# Patient Record
Sex: MALE | Race: WHITE | NOT HISPANIC OR LATINO | URBAN - METROPOLITAN AREA
[De-identification: names, ages, dates, MRNs, and addresses within clinical notes are randomized per-mention and may not be internally consistent; named-entity substitution may affect disease eponyms.]

---

## 2019-01-14 ENCOUNTER — OUTPATIENT (OUTPATIENT)
Dept: INPATIENT UNIT | Facility: HOSPITAL | Age: 54
LOS: 1 days | Discharge: HOME | End: 2019-01-14
Payer: COMMERCIAL

## 2019-01-14 ENCOUNTER — RESULT REVIEW (OUTPATIENT)
Age: 54
End: 2019-01-14

## 2019-01-14 VITALS
WEIGHT: 237.66 LBS | HEIGHT: 74 IN | DIASTOLIC BLOOD PRESSURE: 79 MMHG | RESPIRATION RATE: 16 BRPM | HEART RATE: 78 BPM | OXYGEN SATURATION: 97 % | SYSTOLIC BLOOD PRESSURE: 135 MMHG | TEMPERATURE: 98 F

## 2019-01-14 VITALS — DIASTOLIC BLOOD PRESSURE: 56 MMHG | SYSTOLIC BLOOD PRESSURE: 100 MMHG | HEART RATE: 67 BPM | RESPIRATION RATE: 15 BRPM

## 2019-01-14 DIAGNOSIS — Z98.890 OTHER SPECIFIED POSTPROCEDURAL STATES: Chronic | ICD-10-CM

## 2019-01-14 PROCEDURE — 88302 TISSUE EXAM BY PATHOLOGIST: CPT

## 2019-01-14 PROCEDURE — 55250 REMOVAL OF SPERM DUCT(S): CPT

## 2019-01-14 RX ORDER — ONDANSETRON 8 MG/1
4 TABLET, FILM COATED ORAL EVERY 6 HOURS
Qty: 0 | Refills: 0 | Status: DISCONTINUED | OUTPATIENT
Start: 2019-01-14 | End: 2019-01-14

## 2019-01-14 RX ORDER — MORPHINE SULFATE 50 MG/1
2 CAPSULE, EXTENDED RELEASE ORAL EVERY 4 HOURS
Qty: 0 | Refills: 0 | Status: DISCONTINUED | OUTPATIENT
Start: 2019-01-14 | End: 2019-01-14

## 2019-01-14 RX ORDER — SODIUM CHLORIDE 9 MG/ML
1000 INJECTION, SOLUTION INTRAVENOUS
Qty: 0 | Refills: 0 | Status: DISCONTINUED | OUTPATIENT
Start: 2019-01-14 | End: 2019-01-14

## 2019-01-14 RX ORDER — OXYCODONE AND ACETAMINOPHEN 5; 325 MG/1; MG/1
1 TABLET ORAL EVERY 4 HOURS
Qty: 0 | Refills: 0 | Status: DISCONTINUED | OUTPATIENT
Start: 2019-01-14 | End: 2019-01-14

## 2019-01-14 RX ORDER — ACETAMINOPHEN 500 MG
650 TABLET ORAL EVERY 6 HOURS
Qty: 0 | Refills: 0 | Status: DISCONTINUED | OUTPATIENT
Start: 2019-01-14 | End: 2019-01-14

## 2019-01-14 NOTE — PACU DISCHARGE NOTE - COMMENTS
Pt discharged to home. Discharge paperwork and instructions given to pt and pt's wife. Iv heplock removed. Safety protocol maintained.

## 2019-01-16 LAB — SURGICAL PATHOLOGY STUDY: SIGNIFICANT CHANGE UP

## 2021-03-25 ENCOUNTER — APPOINTMENT (OUTPATIENT)
Dept: UROLOGY | Facility: CLINIC | Age: 56
End: 2021-03-25
Payer: COMMERCIAL

## 2021-03-25 DIAGNOSIS — Z00.00 ENCOUNTER FOR GENERAL ADULT MEDICAL EXAMINATION W/OUT ABNORMAL FINDINGS: ICD-10-CM

## 2021-03-25 PROBLEM — I10 ESSENTIAL (PRIMARY) HYPERTENSION: Chronic | Status: ACTIVE | Noted: 2019-01-11

## 2021-03-25 PROCEDURE — 99214 OFFICE O/P EST MOD 30 MIN: CPT | Mod: 95

## 2021-03-25 RX ORDER — AVANAFIL 200 MG/1
200 TABLET ORAL DAILY
Qty: 3 | Refills: 1 | Status: ACTIVE | COMMUNITY
Start: 2021-03-25 | End: 1900-01-01

## 2021-03-25 NOTE — LETTER BODY
[Dear  ___] : Dear  [unfilled], [Please see my note below.] : Please see my note below. [Consult Closing:] : Thank you very much for allowing me to participate in the care of this patient.  If you have any questions, please do not hesitate to contact me. [FreeTextEntry1] : .teleleter\par  [FreeTextEntry3] : Best Regards, \par \par Denise Wheatley MD\par

## 2021-03-25 NOTE — ASSESSMENT
[FreeTextEntry1] : We discussed renewal of the Stendra and I also discussed changing to double drug therapy with tadalafil daily and sildenafil PRN for added efficacy and less cost since both of these are generic. He will consider this option and notify me after trying the Stendra again. Patient also inquired on more definitive treatments and we again discussed further in office evaluation prior to this discussion.\par \par We also discussed prostate screening with PSA and HAILEE. I recommended and ordered the PSA now and we made plans to meed in 4 months to reassess and to perform HAILEE. We ended the video portion of the visit at 2:01 PM. Denise Wheatley MD\par

## 2021-03-25 NOTE — HISTORY OF PRESENT ILLNESS
[Other Location: e.g. School (Enter Location, City,State)___] : at [unfilled], at the time of the visit. [Other Location: e.g. Home (Enter Location, City,State)___] : at [unfilled] [Verbal consent obtained from patient] : the patient, [unfilled] [FreeTextEntry1] :  The patient-doctor relationship has been established in a face to face fashion via real time video/audio HIPAA compliant communication using telemedicine software. The patient's identity has been confirmed. The patient was previously emailed a copy of the telemedicine consent. They have had a chance to review and has now given verbal consent and has requested care to be assessed and treated through telemedicine and understands there maybe limitations in this process and they may need further follow up care in the office and or hospital settings. We were not able to connect via [employer] Well, patient requested alternative platform, Face Time.\par Verbal consent given on 3/25/2021. 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 He recovered with no issues and had at least 1 post vasectomy sperm count that returned 0. \par \par Patient seen today 3/25/2021 for ED for which he has used Stendra 200 mg PRN with good response in the past after his response to Cialis 20 mg PRN started to diminish. Patient told me that he has been doing well, was vaccinated, and except for mild urgency at times, he has no LUTS, dysuria, hematuria or nocturia. He had a recent PE with his PCP but does not recall if he had a PSA test. \par \par Patient is also on ramipril and HCTZ for HTN, he also takes Wellbutrin methylphenidate and small dose of Suboxone. Allergic to PCN.  The patient denies fevers, chills, nausea and or vomiting and no unexplained weight loss. \par All pertinent parts of the patient PFSH (past medical, family and social histories), laboratory, radiological studies and physician notes were reviewed prior to starting the face to face portion of the telemedicine visit. Questionnaire results were discussed with patient.\par

## 2021-10-19 RX ORDER — AVANAFIL 200 MG/1
200 TABLET ORAL
Qty: 3 | Refills: 3 | Status: ACTIVE | COMMUNITY
Start: 2021-10-19 | End: 1900-01-01

## 2021-10-19 RX ORDER — TADALAFIL 5 MG/1
5 TABLET ORAL
Qty: 30 | Refills: 0 | Status: ACTIVE | COMMUNITY
Start: 2021-10-19 | End: 1900-01-01

## 2023-01-25 ENCOUNTER — NON-APPOINTMENT (OUTPATIENT)
Age: 58
End: 2023-01-25

## 2023-01-26 ENCOUNTER — TRANSCRIPTION ENCOUNTER (OUTPATIENT)
Age: 58
End: 2023-01-26

## 2023-01-26 ENCOUNTER — APPOINTMENT (OUTPATIENT)
Dept: UROLOGY | Facility: CLINIC | Age: 58
End: 2023-01-26
Payer: COMMERCIAL

## 2023-01-26 PROCEDURE — 99214 OFFICE O/P EST MOD 30 MIN: CPT | Mod: 95

## 2023-01-26 NOTE — LETTER BODY
[Dear  ___] : Dear  [unfilled], [Courtesy Letter:] : I had the pleasure of seeing your patient, [unfilled], in my office today. [Please see my note below.] : Please see my note below. [Consult Closing:] : Thank you very much for allowing me to participate in the care of this patient.  If you have any questions, please do not hesitate to contact me. [FreeTextEntry3] : Best Regards, \par \par Denise Wheatley MD\par

## 2023-01-26 NOTE — ASSESSMENT
[FreeTextEntry1] : We discussed the importance of taking both PDE5I medications consistently to properly reassess ED. We also discussed sending his medications to a compound pharmacy to lower the cost. Tadalafil 5 mg daily and sildenafil 100 mg PRN was sent to [facility]. We reviewed the indications, risks, alternatives and chances for success with this off label use. If this does not prove to be satisfactory, then the next step would be Duplex US for evaluation for ICI.\par \par He is also due to have his PSA drawn, and a requisition was emailed to him. I emphasized the importance of scheduling an in-person visit, as he has not been physically examined for 2 years. He will schedule this at his earliest convenience. Denise Wheatley MD\par

## 2023-01-26 NOTE — HISTORY OF PRESENT ILLNESS
[Home] : at home, [unfilled] , at the time of the visit. [Medical Office: ([facility])___] : at the medical office located in  [Verbal consent obtained from patient] : the patient, [unfilled] [FreeTextEntry1] :  The patient-doctor relationship has been established in a face to face fashion via real time video/audio HIPAA compliant communication using telemedicine software. The patient's identity has been confirmed. The patient was previously emailed a copy of the telemedicine consent. They have had a chance to review and has now given verbal consent and has requested care to be assessed and treated through telemedicine and understands there maybe limitations in this process and they may need further follow up care in the office and or hospital settings. 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 He recovered with no issues and had at least 1 post vasectomy sperm count that returned 0. \par \par Patient seen 3/25/2021 via [facility] for ED for which he has used Stendra 200 mg PRN with good response in the past after his response to Cialis 20 mg PRN started to diminish. Patient told me that he has been doing well, was vaccinated, and except for mild urgency at times, he has no LUTS, dysuria, hematuria or nocturia. He had a recent PE with his PCP but does not recall if he had a PSA test. \par We discussed renewal of the Stendra and I also discussed changing to double drug therapy with tadalafil daily and sildenafil PRN for added efficacy and less cost since both of these are generic. He will consider this option and notify me after trying the Stendra again. Patient also inquired on more definitive treatments and we again discussed further in office evaluation prior to this discussion.\par We also discussed prostate screening with PSA and HAILEE. I recommended and ordered the PSA now and we made plans to meed in 4 months to reassess and to perform HAILEE. \par \par Patient seen TODAY 1/26/2023 via [facility]. He has not used the daily tadalafil 5 mg and sildenafil 100 PRN consistently for a reassessment. He had issues with cost for both medications. He is anxious to try tese again.\par \par Patient is also on ramipril and HCTZ for HTN, he also takes Wellbutrin methylphenidate and small dose of Suboxone. Allergic to PCN.  The patient denies fevers, chills, nausea and or vomiting and no unexplained weight loss. \par All pertinent parts of the patient PFSH (past medical, family and social histories), laboratory, radiological studies and physician notes were reviewed prior to starting the face to face portion of the telemedicine visit. Questionnaire results were discussed with patient.\par

## 2023-02-22 ENCOUNTER — RESULT CHARGE (OUTPATIENT)
Age: 58
End: 2023-02-22

## 2023-02-22 ENCOUNTER — APPOINTMENT (OUTPATIENT)
Dept: UROLOGY | Facility: CLINIC | Age: 58
End: 2023-02-22
Payer: COMMERCIAL

## 2023-02-22 VITALS
BODY MASS INDEX: 29.52 KG/M2 | SYSTOLIC BLOOD PRESSURE: 168 MMHG | HEIGHT: 74 IN | TEMPERATURE: 97.4 F | HEART RATE: 90 BPM | WEIGHT: 230 LBS | DIASTOLIC BLOOD PRESSURE: 75 MMHG | OXYGEN SATURATION: 97 %

## 2023-02-22 LAB
BILIRUB UR QL STRIP: NORMAL
CLARITY UR: CLEAR
COLLECTION METHOD: NORMAL
GLUCOSE UR-MCNC: NORMAL
HCG UR QL: 0.2 EU/DL
HGB UR QL STRIP.AUTO: NORMAL
KETONES UR-MCNC: NORMAL
LEUKOCYTE ESTERASE UR QL STRIP: NORMAL
NITRITE UR QL STRIP: NORMAL
PH UR STRIP: 5
PROT UR STRIP-MCNC: NORMAL
SP GR UR STRIP: >=1.03

## 2023-02-22 PROCEDURE — 99213 OFFICE O/P EST LOW 20 MIN: CPT

## 2023-02-22 NOTE — PHYSICAL EXAM
[General Appearance - Well Developed] : well developed [General Appearance - Well Nourished] : well nourished [Normal Appearance] : normal appearance [Well Groomed] : well groomed [General Appearance - In No Acute Distress] : no acute distress [Oriented To Time, Place, And Person] : oriented to person, place, and time [Affect] : the affect was normal [Mood] : the mood was normal [Not Anxious] : not anxious [Urethral Meatus] : meatus normal [Urinary Bladder Findings] : the bladder was normal on palpation [Scrotum] : the scrotum was normal [Testes Mass (___cm)] : there were no testicular masses [Prostate Tenderness] : the prostate was not tender [No Prostate Nodules] : no prostate nodules [Prostate Size ___ (0-4)] : prostate size [unfilled] (scale: 0-4)

## 2023-02-22 NOTE — HISTORY OF PRESENT ILLNESS
[FreeTextEntry1] :  The patient-doctor relationship has been established in a face to face fashion via real time video/audio HIPAA compliant communication using telemedicine software. The patient's identity has been confirmed. The patient was previously emailed a copy of the telemedicine consent. They have had a chance to review and has now given verbal consent and has requested care to be assessed and treated through telemedicine and understands there maybe limitations in this process and they may need further follow up care in the office and or hospital settings. 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 He recovered with no issues and had at least 1 post vasectomy sperm count that returned 0. \par \par Patient seen 3/25/2021 via [facility] for ED for which he has used Stendra 200 mg PRN with good response in the past after his response to Cialis 20 mg PRN started to diminish. Patient told me that he has been doing well, was vaccinated, and except for mild urgency at times, he has no LUTS, dysuria, hematuria or nocturia. He had a recent PE with his PCP but does not recall if he had a PSA test. \par We discussed renewal of the Stendra and I also discussed changing to double drug therapy with tadalafil daily and sildenafil PRN for added efficacy and less cost since both of these are generic. He will consider this option and notify me after trying the Stendra again. Patient also inquired on more definitive treatments and we again discussed further in office evaluation prior to this discussion.\par We also discussed prostate screening with PSA and HAILEE. I recommended and ordered the PSA now and we made plans to meed in 4 months to reassess and to perform HAILEE. \par \par Patient seen 1/26/2023 via [facility]. He has not used the daily tadalafil 5 mg and sildenafil 100 PRN consistently for a reassessment. He had issues with cost for both medications. He is anxious to try tese again.\par \par Patient is also on ramipril and HCTZ for HTN, he also takes Wellbutrin methylphenidate and small dose of Suboxone. Allergic to PCN.  The patient denies fevers, chills, nausea and or vomiting and no unexplained weight loss. \par All pertinent parts of the patient PFSH (past medical, family and social histories), laboratory, radiological studies and physician notes were reviewed prior to starting the face to face portion of the telemedicine visit. Questionnaire results were discussed with patient.\par \par We discussed the importance of taking both PDE5I medications consistently to properly reassess ED. We also discussed sending his medications to a compound pharmacy to lower the cost. Tadalafil 5 mg daily and sildenafil 100 mg PRN was sent to [facility]. We reviewed the indications, risks, alternatives and chances for success with this off label use. If this does not prove to be satisfactory, then the next step would be Duplex US for evaluation for ICI.\par \par He is also due to have his PSA drawn, and a requisition was emailed to him. I emphasized the importance of scheduling an in-person visit, as he has not been physically examined for 2 years. He will schedule this at his earliest convenience.\par PSA from 2/1/2023 0.6\par \par Patient seen TODAY 2/22/2023 to reassess erectile dysfunction. He returns on combination therapy daily tadalafil 5 mg and sildenafil 100 mg PRN. He told me that he has been using just the tadalafil for ow and has noted an improvement in his erections. He is also tapering off the Suboxone medication. He has not yet added the sildenafil. \par \par UA negative\par GABRIEL 16 (baed op before starting the tadalafil)\par

## 2023-02-22 NOTE — ASSESSMENT
[FreeTextEntry1] : We reviewed the patient's normal physical examination and normal recent PSA value today.  No further evaluation of the prostate is indicated at this time and I recommended yearly follow-up.\par \par As for his ED, he appears to be having a good response to the daily tadalafil 5 mg along.  He also noted decrease in any side effects when taking the medication on a daily basis.  I recommended that he continue at this level for the present time being but feel free to add the sildenafil citrate 50 to 100 mg 1 hour before sexual activity if needed to improve erectile function with sex.  We also discussed the potential beneficial effect of tapering his Suboxone and we will continue to follow this moving forward. Denise Wheatley MD\par

## 2024-04-03 ENCOUNTER — NON-APPOINTMENT (OUTPATIENT)
Age: 59
End: 2024-04-03

## 2024-05-08 ENCOUNTER — APPOINTMENT (OUTPATIENT)
Dept: UROLOGY | Facility: CLINIC | Age: 59
End: 2024-05-08
Payer: COMMERCIAL

## 2024-05-08 DIAGNOSIS — N52.01 ERECTILE DYSFUNCTION DUE TO ARTERIAL INSUFFICIENCY: ICD-10-CM

## 2024-05-08 DIAGNOSIS — Z12.5 ENCOUNTER FOR SCREENING FOR MALIGNANT NEOPLASM OF PROSTATE: ICD-10-CM

## 2024-05-08 LAB
BILIRUB UR QL STRIP: NORMAL
CLARITY UR: CLEAR
COLLECTION METHOD: NORMAL
GLUCOSE UR-MCNC: NORMAL
HCG UR QL: 0.2 EU/DL
HGB UR QL STRIP.AUTO: NORMAL
KETONES UR-MCNC: NORMAL
LEUKOCYTE ESTERASE UR QL STRIP: NORMAL
NITRITE UR QL STRIP: NORMAL
PH UR STRIP: 5.5
PROT UR STRIP-MCNC: NORMAL
SP GR UR STRIP: 1

## 2024-05-08 PROCEDURE — 81003 URINALYSIS AUTO W/O SCOPE: CPT | Mod: QW

## 2024-05-08 PROCEDURE — 99214 OFFICE O/P EST MOD 30 MIN: CPT

## 2024-05-08 RX ORDER — TADALAFIL 5 MG/1
5 TABLET ORAL
Qty: 90 | Refills: 3 | Status: ACTIVE | COMMUNITY
Start: 2023-01-26 | End: 1900-01-01

## 2024-05-08 RX ORDER — SILDENAFIL 100 MG/1
100 TABLET, FILM COATED ORAL
Qty: 30 | Refills: 3 | Status: ACTIVE | COMMUNITY
Start: 2023-01-26 | End: 1900-01-01

## 2024-05-08 NOTE — PHYSICAL EXAM
[Normal Appearance] : normal appearance [Well Groomed] : well groomed [General Appearance - In No Acute Distress] : no acute distress [Edema] : no peripheral edema [] : no respiratory distress [Abdomen Soft] : soft [Abdomen Tenderness] : non-tender [Urethral Meatus] : meatus normal [Penis Abnormality] : normal circumcised penis [Urinary Bladder Findings] : the bladder was normal on palpation [Epididymis] : the epididymides were normal [Testes Tenderness] : no tenderness of the testes [Testes Mass (___cm)] : there were no testicular masses [Prostate Tenderness] : the prostate was not tender [No Prostate Nodules] : no prostate nodules [Prostate Size ___ (0-4)] : prostate size [unfilled] (scale: 0-4) [Normal Station and Gait] : the gait and station were normal for the patient's age [Skin Turgor] : supple [No Focal Deficits] : no focal deficits [Oriented To Time, Place, And Person] : oriented to person, place, and time [Affect] : the affect was normal [Mood] : the mood was normal [Not Anxious] : not anxious

## 2024-05-08 NOTE — ASSESSMENT
[FreeTextEntry1] : Evaluation today indicates a good response to double medication for the patient's ED.  These [facility] I recommended that he continue to use them as he has been using them.  At his recent [facility] we also discussed alternatives to oral PDE 5 RI medication for ED.  These include either intracavernous injection therapy, the use of a penile prosthesis or a AJ.  I reviewed these options along with their benefits and risks, their indications and their chances for success.  At the present time, the patient is inclined to remain on the oral medications and I agree wholeheartedly with this decision.  Blood was sent for PSA test today and medications were renewed.  If the PSA remains stable, then follow-up in 1 year. Denise Wheatley MD

## 2024-05-08 NOTE — HISTORY OF PRESENT ILLNESS
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 He recovered with no issues and had at least 1 post vasectomy sperm count that returned 0.   Patient seen 3/25/2021 via [facility] for ED for which he has used Stendra 200 mg PRN with good response in the past after his response to Cialis 20 mg PRN started to diminish. Patient told me that he has been doing well, was vaccinated, and except for mild urgency at times, he has no LUTS, dysuria, hematuria or nocturia. He had a recent PE with his PCP but does not recall if he had a PSA test.  We discussed renewal of the Stendra and I also discussed changing to double drug therapy with tadalafil daily and sildenafil PRN for added efficacy and less cost since both of these are generic. He will consider this option and notify me after trying the Stendra again. Patient also inquired on more definitive treatments and we again discussed further in office evaluation prior to this discussion. We also discussed prostate screening with PSA and HAILEE. I recommended and ordered the PSA now and we made plans to meed in 4 months to reassess and to perform HAILEE.   Patient seen 1/26/2023 via [facility]. He has not used the daily tadalafil 5 mg and sildenafil 100 PRN consistently for a reassessment. He had issues with cost for both medications. He is anxious to try tese again.  Patient is also on ramipril and HCTZ for HTN, he also takes Wellbutrin methylphenidate and small dose of Suboxone. Allergic to PCN.  The patient denies fevers, chills, nausea and or vomiting and no unexplained weight loss.  All pertinent parts of the patient PFSH (past medical, family and social histories), laboratory, radiological studies and physician notes were reviewed prior to starting the face to face portion of the telemedicine visit. Questionnaire results were discussed with patient.  We discussed the importance of taking both PDE5I medications consistently to properly reassess ED. We also discussed sending his medications to a compound pharmacy to lower the cost. Tadalafil 5 mg daily and sildenafil 100 mg PRN was sent to [facility]. We reviewed the indications, risks, alternatives and chances for success with this off label use. If this does not prove to be satisfactory, then the next step would be Duplex US for evaluation for ICI.  He is also due to have his PSA drawn, and a requisition was emailed to him. I emphasized the importance of scheduling an in-person visit, as he has not been physically examined for 2 years. He will schedule this at his earliest convenience. PSA from 2/1/2023 0.6  Patient seen 2/22/2023 to reassess erectile dysfunction. He returns on combination therapy daily tadalafil 5 mg and sildenafil 100 mg PRN. He told me that he has been using just the tadalafil for ow and has noted an improvement in his erections. He is also tapering off the Suboxone medication. He has not yet added the sildenafil.  UA negative GABRIEL 16 (based on before starting the tadalafil) As for his ED, he appears to be having a good response to the daily tadalafil 5 mg along. He also noted decrease in any side effects when taking the medication on a daily basis. I recommended that he continue at this level for the present time being but feel free to add the sildenafil citrate 50 to 100 mg 1 hour before sexual activity if needed to improve erectile function with sex. We also discussed the potential beneficial effect of tapering his Suboxone and we will continue to follow this moving forward.  Patient seen TODAY 5/8/2024 to reassess. He told me that he is doing well with no urinary complaints and no nocturia. He finds that the combination of daily tadalafil 5 mg and PRN sildenafil 100 mg works very well most of the time, and he requests refills from [facility]. He does have some concerns over what alternatives there may be if the present combination wands in effect.  UA negative IPSS 1 AMY 1 GABRIEL 16

## 2024-05-08 NOTE — LETTER BODY
[Dear  ___] : Dear  [unfilled], [Courtesy Letter:] : I had the pleasure of seeing your patient, [unfilled], in my office today. [Please see my note below.] : Please see my note below. [Consult Closing:] : Thank you very much for allowing me to participate in the care of this patient.  If you have any questions, please do not hesitate to contact me. [FreeTextEntry3] : Best Regards,   Denise Wheatley MD

## 2024-05-10 LAB — PSA SERPL-MCNC: 0.65 NG/ML

## 2025-05-14 ENCOUNTER — APPOINTMENT (OUTPATIENT)
Dept: UROLOGY | Facility: CLINIC | Age: 60
End: 2025-05-14

## 2025-07-14 ENCOUNTER — APPOINTMENT (OUTPATIENT)
Dept: UROLOGY | Facility: CLINIC | Age: 60
End: 2025-07-14